# Patient Record
Sex: FEMALE | URBAN - METROPOLITAN AREA
[De-identification: names, ages, dates, MRNs, and addresses within clinical notes are randomized per-mention and may not be internally consistent; named-entity substitution may affect disease eponyms.]

---

## 2017-10-17 NOTE — PATIENT DISCUSSION
CLASSIC MIGRAINE WITHOUT VISUAL AURA:  AVOID PRECIPITATING TRIGGERS. CONTINUE TO FOLLOW WITH NEUROLOGIST.

## 2017-11-28 NOTE — PATIENT DISCUSSION
Stopped Today: Lotemax (loteprednol etabonate): gel: 0.5% 1 drop four times a day into both eyes 10-

## 2017-11-28 NOTE — PATIENT DISCUSSION
DRY EYE WITH INFLAMMATION: CONTINUE PRESERVATIVE FREE ARTIFICIAL TEARS Q2 HOURS, LID HYGIENE. ADD LL PLUGS OU. RETURN SOONER IF SYMPTOMS WORSEN.

## 2017-12-12 NOTE — PATIENT DISCUSSION
MODERATE DRY EYES OU: S/P LL PLUGS. CONTINUE PRESERVATIVE FREE ARTIFICIAL TEARS Q2 HOURS, LID HYGIENE. OMEGA-3. BACK OFF ON PRESERVATIVE SOOTHE. ADD XIIDRA BID OU. GEL OR CRISTINE QHS. RETURN SOONER IF SYMPTOMS WORSEN.

## 2017-12-12 NOTE — PATIENT DISCUSSION
New Prescription: Delmy Carlton (lifitegrast): dropperette: 5% 1 drop twice a day as directed into both eyes 12-

## 2018-06-08 NOTE — PATIENT DISCUSSION
New Prescription: Pranav Fuentes (lifitegrast): dropperette: 5% 1 drop twice a day as directed into both eyes 06-

## 2018-07-02 NOTE — PATIENT DISCUSSION
Continue: Josemanuel Zavala (lifitegrast): dropperette: 5% 1 drop twice a day as directed into both eyes 06-

## 2018-07-02 NOTE — PATIENT DISCUSSION
New Prescription: Lotemax (loteprednol etabonate): ointment: 0.5% a small amount at bedtime into both eyes 07-

## 2018-07-02 NOTE — PATIENT DISCUSSION
MODERATE DRY EYES OU: S/P LL PLUGS. CONTINUE PRESERVATIVE FREE ARTIFICIAL TEARS Q2 HOURS, LID HYGIENE. OMEGA-3. BACK OFF ON PRESERVATIVE SOOTHE. CONTINUE XIIDRA BID OU. GEL OR CRISTINE QHS. RETURN SOONER IF SYMPTOMS WORSEN. ADD LOTEMAX CRISTINE QHS FOR TWO WEEKS OU.

## 2018-07-17 NOTE — PATIENT DISCUSSION
MODERATE DRY EYES OU: S/P LL PLUGS. CONTINUE PRESERVATIVE FREE ARTIFICIAL TEARS Q2 HOURS, LID HYGIENE. OMEGA-3. BACK OFF ON PRESERVATIVE SOOTHE. CONTINUE XIIDRA BID OU. GEL OR CRISTINE QHS. RETURN SOONER IF SYMPTOMS WORSEN. PATIENT NOTICED LIMITED IMPROVEMENT WITH LOTEMAX CRISTINE QHS.  ADD CELLUVISC QID  REFER TO [facility] FOR EVAL OF OSCAR AND PINGUEC

## 2018-07-17 NOTE — PATIENT DISCUSSION
Continue: Alexy Bartlett (lifitegrast): dropperette: 5% 1 drop twice a day as directed into both eyes 06-

## 2018-07-17 NOTE — PATIENT DISCUSSION
Pinguecula Counseling:  I have explained to the patient at length the diagnosis of pinguecula and its pathophysiology. I recommended the patient adequately protect their eyes from excessive UV light and dry, dominique conditions. The use of artificial tears in dry conditions was encouraged. Return for follow-up as scheduled.

## 2018-07-17 NOTE — PATIENT DISCUSSION
Stopped Today: Lotemax (loteprednol etabonate): ointment: 0.5% a small amount at bedtime into both eyes 07-

## 2018-12-31 NOTE — PATIENT DISCUSSION
Continue: Chidi Tobias (lifitegrast): dropperette: 5% 1 drop twice a day as directed into both eyes 06-

## 2018-12-31 NOTE — PATIENT DISCUSSION
MODERATE DRY EYES OU: S/P LL PLUGS. CONTINUE PRESERVATIVE FREE ARTIFICIAL TEARS Q2 HOURS, LID HYGIENE. OMEGA-3. BACK OFF ON PRESERVATIVE SOOTHE. CONTINUE XIIDRA BID OU. GEL OR CRISTINE QHS. RETURN SOONER IF SYMPTOMS WORSEN.  . ADD CELLUVISC QID

## 2019-06-21 NOTE — PATIENT DISCUSSION
Continue: Chico Chisholm (lifitegrast): dropperette: 5% 1 drop twice a day as directed into both eyes 06-

## 2019-06-21 NOTE — PATIENT DISCUSSION
MODERATE DRY EYES OU: S/P LL PLUGS. CONTINUE PRESERVATIVE FREE ARTIFICIAL TEARS Q2 HOURS, LID HYGIENE. OMEGA-3. CONTINUE XIIDRA BID OU. GEL OR CRISTINE QHS. RETURN SOONER IF SYMPTOMS WORSEN.  CONTINUE CELLUVISC QID REFER TO [facility] FOR EVAL OF OSCAR AND PINGUEC

## 2019-11-11 NOTE — PATIENT DISCUSSION
MODERATE DRY EYES OU: S/P LL PLUGS. CONTINUE PRESERVATIVE FREE ARTIFICIAL TEARS Q2 HOURS, LID HYGIENE. OMEGA-3. CONTINUE XIIDRA BID OU. GEL OR CRISTINE QHS. RETURN SOONER IF SYMPTOMS WORSEN. CONTINUE CELLUVISC QI. ADD LOTEMAX CRISTINE QHS X 2 WEEKS.  RESEND REFERRAL TO [facility] FOR EVAL OF OSCAR AND PINGUEC

## 2019-11-12 ENCOUNTER — IMPORTED ENCOUNTER (OUTPATIENT)
Dept: URBAN - METROPOLITAN AREA CLINIC 31 | Facility: CLINIC | Age: 64
End: 2019-11-12

## 2019-11-12 PROBLEM — H25.13: Noted: 2019-11-12

## 2019-11-12 PROCEDURE — 92015 DETERMINE REFRACTIVE STATE: CPT

## 2019-11-12 PROCEDURE — 92014 COMPRE OPH EXAM EST PT 1/>: CPT

## 2019-12-18 NOTE — PATIENT DISCUSSION
Continue: Lotemax (loteprednol etabonate): ointment: 0.5% a small amount at bedtime into both eyes 11-

## 2019-12-18 NOTE — PATIENT DISCUSSION
Continue: Jez Castle (lifitegrast): dropperette: 5% 1 drop twice a day as directed into both eyes 06-

## 2021-08-04 NOTE — PATIENT DISCUSSION
Continue: Refresh Celluvisc (carboxymethylcellulose sodium): dropperette,gel: 1% 1 drop as needed into both eyes Normal genitalia; no lesions; no discharge

## 2021-11-16 ENCOUNTER — IMPORTED ENCOUNTER (OUTPATIENT)
Dept: URBAN - METROPOLITAN AREA CLINIC 31 | Facility: CLINIC | Age: 66
End: 2021-11-16

## 2021-11-16 PROBLEM — H25.13: Noted: 2021-11-16

## 2021-11-16 PROCEDURE — 92015 DETERMINE REFRACTIVE STATE: CPT

## 2021-11-16 PROCEDURE — 92014 COMPRE OPH EXAM EST PT 1/>: CPT

## 2021-11-19 ENCOUNTER — APPOINTMENT (RX ONLY)
Dept: URBAN - METROPOLITAN AREA CLINIC 121 | Facility: CLINIC | Age: 66
Setting detail: DERMATOLOGY
End: 2021-11-19

## 2021-11-19 DIAGNOSIS — L81.4 OTHER MELANIN HYPERPIGMENTATION: ICD-10-CM

## 2021-11-19 DIAGNOSIS — L82.1 OTHER SEBORRHEIC KERATOSIS: ICD-10-CM

## 2021-11-19 DIAGNOSIS — D18.0 HEMANGIOMA: ICD-10-CM

## 2021-11-19 DIAGNOSIS — L85.3 XEROSIS CUTIS: ICD-10-CM

## 2021-11-19 DIAGNOSIS — Z71.89 OTHER SPECIFIED COUNSELING: ICD-10-CM

## 2021-11-19 PROBLEM — D18.01 HEMANGIOMA OF SKIN AND SUBCUTANEOUS TISSUE: Status: ACTIVE | Noted: 2021-11-19

## 2021-11-19 PROCEDURE — 99203 OFFICE O/P NEW LOW 30 MIN: CPT

## 2021-11-19 PROCEDURE — ? SUNSCREEN RECOMMENDATIONS

## 2021-11-19 PROCEDURE — ? PRESCRIPTION

## 2021-11-19 RX ORDER — UREA 400 MG/G
CREAM TOPICAL QD
Qty: 28.35 | Refills: 2 | Status: ERX | COMMUNITY
Start: 2021-11-19

## 2021-11-19 RX ADMIN — UREA: 400 CREAM TOPICAL at 00:00

## 2021-11-19 ASSESSMENT — LOCATION SIMPLE DESCRIPTION DERM
LOCATION SIMPLE: LEFT INDEX FINGER
LOCATION SIMPLE: LEFT RING FINGER
LOCATION SIMPLE: LEFT SMALL FINGER
LOCATION SIMPLE: RIGHT UPPER BACK
LOCATION SIMPLE: LEFT MIDDLE FINGER
LOCATION SIMPLE: RIGHT INDEX FINGER
LOCATION SIMPLE: RIGHT SMALL FINGER
LOCATION SIMPLE: RIGHT MIDDLE FINGER
LOCATION SIMPLE: RIGHT RING FINGER
LOCATION SIMPLE: ABDOMEN

## 2021-11-19 ASSESSMENT — LOCATION DETAILED DESCRIPTION DERM
LOCATION DETAILED: LEFT DISTAL PALMAR INDEX FINGER
LOCATION DETAILED: LEFT DISTAL PALMAR RING FINGER
LOCATION DETAILED: RIGHT DISTAL PALMAR MIDDLE FINGER
LOCATION DETAILED: RIGHT DISTAL PALMAR INDEX FINGER
LOCATION DETAILED: PERIUMBILICAL SKIN
LOCATION DETAILED: LEFT DISTAL PALMAR SMALL FINGER
LOCATION DETAILED: RIGHT DISTAL PALMAR RING FINGER
LOCATION DETAILED: RIGHT SUPERIOR MEDIAL UPPER BACK
LOCATION DETAILED: LEFT MIDDLE FINGERTIP
LOCATION DETAILED: LEFT RIB CAGE
LOCATION DETAILED: RIGHT MEDIAL UPPER BACK
LOCATION DETAILED: RIGHT DISTAL PALMAR SMALL FINGER

## 2021-11-19 ASSESSMENT — LOCATION ZONE DERM
LOCATION ZONE: TRUNK
LOCATION ZONE: FINGER

## 2022-04-02 ASSESSMENT — VISUAL ACUITY
OS_CC: 20/30-1
OD_SC: 20/25-3
OS_SC: 20/20-1
OD_PH: SC 20/20 -1
OD_CC: 20/40+1
OD_SC: 20/5017''
OS_CC: 20/30-1
OD_CC: 20/40

## 2022-04-02 ASSESSMENT — TONOMETRY
OS_IOP_MMHG: 16
OD_IOP_MMHG: 13
OD_IOP_MMHG: 16
OS_IOP_MMHG: 14

## 2022-07-09 ENCOUNTER — TELEPHONE ENCOUNTER (OUTPATIENT)
Dept: URBAN - METROPOLITAN AREA CLINIC 121 | Facility: CLINIC | Age: 67
End: 2022-07-09

## 2022-07-10 ENCOUNTER — TELEPHONE ENCOUNTER (OUTPATIENT)
Dept: URBAN - METROPOLITAN AREA CLINIC 121 | Facility: CLINIC | Age: 67
End: 2022-07-10

## 2022-07-30 ENCOUNTER — TELEPHONE ENCOUNTER (OUTPATIENT)
Age: 67
End: 2022-07-30

## 2022-07-31 ENCOUNTER — TELEPHONE ENCOUNTER (OUTPATIENT)
Age: 67
End: 2022-07-31

## 2022-08-05 ENCOUNTER — APPOINTMENT (RX ONLY)
Dept: URBAN - METROPOLITAN AREA CLINIC 121 | Facility: CLINIC | Age: 67
Setting detail: DERMATOLOGY
End: 2022-08-05

## 2022-08-05 DIAGNOSIS — L30.9 DERMATITIS, UNSPECIFIED: ICD-10-CM

## 2022-08-05 DIAGNOSIS — L29.89 OTHER PRURITUS: ICD-10-CM

## 2022-08-05 PROBLEM — L29.8 OTHER PRURITUS: Status: ACTIVE | Noted: 2022-08-05

## 2022-08-05 PROCEDURE — 99214 OFFICE O/P EST MOD 30 MIN: CPT | Mod: 25

## 2022-08-05 PROCEDURE — ? PHOTO-DOCUMENTATION

## 2022-08-05 PROCEDURE — ? COUNSELING

## 2022-08-05 PROCEDURE — 11102 TANGNTL BX SKIN SINGLE LES: CPT

## 2022-08-05 PROCEDURE — ? PRESCRIPTION MEDICATION MANAGEMENT

## 2022-08-05 PROCEDURE — ? BIOPSY BY SHAVE METHOD

## 2022-08-05 ASSESSMENT — LOCATION DETAILED DESCRIPTION DERM
LOCATION DETAILED: RIGHT MID ULNAR DORSAL INDEX FINGER
LOCATION DETAILED: RIGHT INDEX PROXIMAL INTERPHALANGEAL JOINT
LOCATION DETAILED: LEFT MID DORSAL INDEX FINGER

## 2022-08-05 ASSESSMENT — LOCATION SIMPLE DESCRIPTION DERM
LOCATION SIMPLE: RIGHT INDEX FINGER
LOCATION SIMPLE: LEFT INDEX FINGER

## 2022-08-05 ASSESSMENT — LOCATION ZONE DERM: LOCATION ZONE: FINGER

## 2022-08-05 NOTE — PROCEDURE: BIOPSY BY SHAVE METHOD
Detail Level: Detailed
Depth Of Biopsy: dermis
Was A Bandage Applied: Yes
Size Of Lesion In Cm: 0
Biopsy Type: H and E
Biopsy Method: Personna blade
Anesthesia Type: 1% lidocaine without epinephrine
Anesthesia Volume In Cc (Will Not Render If 0): 0.5
Additional Anesthesia Type: 0.5% lidocaine and 0.5% bupivacaine in a 1:1 solution with 1:200,000 epinephrine and a 1:10 solution of 8.4% sodium bicarbonate
Hemostasis: Aluminum Chloride
Wound Care: Petrolatum
Dressing: bandage
Destruction After The Procedure: No
Type Of Destruction Used: Curettage
Curettage Text: The wound bed was treated with curettage after the biopsy was performed.
Cryotherapy Text: The wound bed was treated with cryotherapy after the biopsy was performed.
Electrodesiccation Text: The wound bed was treated with electrodesiccation after the biopsy was performed.
Electrodesiccation And Curettage Text: The wound bed was treated with electrodesiccation and curettage after the biopsy was performed.
Silver Nitrate Text: The wound bed was treated with silver nitrate after the biopsy was performed.
Lab: [facility] [place]
Lab Facility: [id number]
Consent: The provider's intent is to obtain a tissue sample solely for diagnostic purposes. Written consent to obtain tissue sample was obtained and risks were reviewed including but not limited to scarring, infection, bleeding, scabbing, incomplete removal, nerve damage and allergy to anesthesia.
Post-Care Instructions: I reviewed with the patient in detail post-care instructions. Patient is to keep the biopsy site dry overnight, and then apply bacitracin twice daily until healed. Patient may apply hydrogen peroxide soaks to remove any crusting.
Notification Instructions: Patient will be notified of biopsy results. However, patient instructed to call the office if not contacted within 2 weeks.
Billing Type: [employer]
Information: Selecting Yes will display possible errors in your note based on the variables you have selected. This validation is only offered as a suggestion for you. PLEASE NOTE THAT THE VALIDATION TEXT WILL BE REMOVED WHEN YOU FINALIZE YOUR NOTE. IF YOU WANT TO FAX A PRELIMINARY NOTE YOU WILL NEED TO TOGGLE THIS TO 'NO' IF YOU DO NOT WANT IT IN YOUR FAXED NOTE.

## 2022-08-05 NOTE — PROCEDURE: PRESCRIPTION MEDICATION MANAGEMENT
Detail Level: Simple
Render In Strict Bullet Format?: No
Plan: Biopsy aftercare handout provided provided to patient. \\nSensitive skin care regimen advised and handout given to patient. \\nPatient declines prescription today.
Discontinue Regimen: fragrant soaps
Initiate Treatment: wearing gloves when cleaning

## 2022-08-16 ENCOUNTER — RX ONLY (OUTPATIENT)
Age: 67
Setting detail: RX ONLY
End: 2022-08-16

## 2022-08-16 RX ORDER — CALCIPOTRIENE 0.05 MG/G
1 CREAM TOPICAL BID
Qty: 60 | Refills: 0 | Status: ERX | COMMUNITY
Start: 2022-08-16

## 2022-09-20 ENCOUNTER — PREPPED CHART (OUTPATIENT)
Dept: URBAN - METROPOLITAN AREA CLINIC 31 | Facility: CLINIC | Age: 67
End: 2022-09-20

## 2022-11-22 ENCOUNTER — COMPREHENSIVE EXAM (OUTPATIENT)
Dept: URBAN - METROPOLITAN AREA CLINIC 31 | Facility: CLINIC | Age: 67
End: 2022-11-22

## 2022-11-22 DIAGNOSIS — H52.4: ICD-10-CM

## 2022-11-22 PROCEDURE — 92014 COMPRE OPH EXAM EST PT 1/>: CPT

## 2022-11-22 PROCEDURE — 92015 DETERMINE REFRACTIVE STATE: CPT

## 2022-11-22 ASSESSMENT — VISUAL ACUITY
OD_CC: 20/25-2
OS_CC: J1+
OD_CC: J1
OS_CC: 20/25

## 2022-11-22 ASSESSMENT — TONOMETRY
OS_IOP_MMHG: 14
OD_IOP_MMHG: 14

## 2022-12-02 ENCOUNTER — APPOINTMENT (RX ONLY)
Dept: URBAN - METROPOLITAN AREA CLINIC 333 | Facility: CLINIC | Age: 67
Setting detail: DERMATOLOGY
End: 2022-12-02

## 2022-12-02 ENCOUNTER — APPOINTMENT (RX ONLY)
Dept: URBAN - METROPOLITAN AREA CLINIC 148 | Facility: CLINIC | Age: 67
Setting detail: DERMATOLOGY
End: 2022-12-02

## 2022-12-02 DIAGNOSIS — L40.0 PSORIASIS VULGARIS: ICD-10-CM

## 2022-12-02 PROCEDURE — ? PRESCRIPTION

## 2022-12-02 PROCEDURE — ? COUNSELING

## 2022-12-02 PROCEDURE — 99204 OFFICE O/P NEW MOD 45 MIN: CPT

## 2022-12-02 RX ORDER — TRIAMCINOLONE ACETONIDE 1 MG/G
CREAM TOPICAL
Qty: 454 | Refills: 1 | Status: ERX | COMMUNITY
Start: 2022-12-02

## 2022-12-02 RX ORDER — FLUOCINONIDE 0.5 MG/ML
SOLUTION TOPICAL
Qty: 60 | Refills: 2 | Status: ERX | COMMUNITY
Start: 2022-12-02

## 2022-12-02 RX ADMIN — TRIAMCINOLONE ACETONIDE: 1 CREAM TOPICAL at 00:00

## 2022-12-02 RX ADMIN — FLUOCINONIDE: 0.5 SOLUTION TOPICAL at 00:00

## 2022-12-02 ASSESSMENT — LOCATION SIMPLE DESCRIPTION DERM
LOCATION SIMPLE: LEFT RING FINGERNAIL
LOCATION SIMPLE: LEFT MIDDLE FINGERNAIL
LOCATION SIMPLE: RIGHT RING FINGERNAIL
LOCATION SIMPLE: RIGHT HAND
LOCATION SIMPLE: RIGHT MIDDLE FINGERNAIL
LOCATION SIMPLE: LEFT THUMBNAIL
LOCATION SIMPLE: LEFT THIGH
LOCATION SIMPLE: CHEST
LOCATION SIMPLE: RIGHT THUMBNAIL
LOCATION SIMPLE: LEFT HAND
LOCATION SIMPLE: LEFT SMALL FINGERNAIL
LOCATION SIMPLE: RIGHT SMALL FINGERNAIL
LOCATION SIMPLE: RIGHT INDEX FINGERNAIL
LOCATION SIMPLE: LEFT INDEX FINGERNAIL

## 2022-12-02 ASSESSMENT — LOCATION DETAILED DESCRIPTION DERM
LOCATION DETAILED: LEFT SMALL FINGERNAIL
LOCATION DETAILED: LEFT INDEX FINGERNAIL
LOCATION DETAILED: RIGHT RING FINGERNAIL
LOCATION DETAILED: LEFT MIDDLE FINGER LUNULA
LOCATION DETAILED: RIGHT MIDDLE FINGERNAIL
LOCATION DETAILED: RIGHT ULNAR DORSAL HAND
LOCATION DETAILED: LEFT RING FINGERNAIL
LOCATION DETAILED: MIDDLE STERNUM
LOCATION DETAILED: LEFT THUMBNAIL
LOCATION DETAILED: LEFT ANTERIOR PROXIMAL THIGH
LOCATION DETAILED: RIGHT INDEX FINGERNAIL
LOCATION DETAILED: LEFT ULNAR DORSAL HAND
LOCATION DETAILED: RIGHT SMALL FINGERNAIL
LOCATION DETAILED: RIGHT THUMBNAIL

## 2022-12-02 ASSESSMENT — LOCATION ZONE DERM
LOCATION ZONE: TRUNK
LOCATION ZONE: HAND
LOCATION ZONE: LEG
LOCATION ZONE: FINGERNAIL

## 2022-12-02 NOTE — HPI: EVALUATION OF SKIN LESION(S)
What Type Of Note Output Would You Prefer (Optional)?: Bullet Format
Hpi Title: Evaluation of Skin Lesions
normal

## 2022-12-30 ENCOUNTER — APPOINTMENT (RX ONLY)
Dept: URBAN - METROPOLITAN AREA CLINIC 148 | Facility: CLINIC | Age: 67
Setting detail: DERMATOLOGY
End: 2022-12-30

## 2022-12-30 DIAGNOSIS — B00.1 HERPESVIRAL VESICULAR DERMATITIS: ICD-10-CM

## 2022-12-30 DIAGNOSIS — L40.0 PSORIASIS VULGARIS: ICD-10-CM

## 2022-12-30 PROCEDURE — ? PRESCRIPTION

## 2022-12-30 PROCEDURE — ? ADDITIONAL NOTES

## 2022-12-30 PROCEDURE — 99214 OFFICE O/P EST MOD 30 MIN: CPT

## 2022-12-30 PROCEDURE — ? PRESCRIPTION MEDICATION MANAGEMENT

## 2022-12-30 PROCEDURE — ? COUNSELING

## 2022-12-30 RX ORDER — CLOBETASOL PROPIONATE 0.5 MG/G
CREAM TOPICAL
Qty: 60 | Refills: 2 | Status: ERX | COMMUNITY
Start: 2022-12-30

## 2022-12-30 RX ORDER — FLUOCINONIDE 0.5 MG/ML
SOLUTION TOPICAL
Qty: 60 | Refills: 2 | Status: ERX

## 2022-12-30 RX ORDER — ACYCLOVIR 50 MG/G
OINTMENT TOPICAL
Qty: 30 | Refills: 0 | Status: ERX | COMMUNITY
Start: 2022-12-30

## 2022-12-30 RX ADMIN — ACYCLOVIR: 50 OINTMENT TOPICAL at 00:00

## 2022-12-30 RX ADMIN — CLOBETASOL PROPIONATE: 0.5 CREAM TOPICAL at 00:00

## 2022-12-30 ASSESSMENT — LOCATION SIMPLE DESCRIPTION DERM
LOCATION SIMPLE: LEFT THUMBNAIL
LOCATION SIMPLE: RIGHT MIDDLE FINGERNAIL
LOCATION SIMPLE: RIGHT INDEX FINGERNAIL
LOCATION SIMPLE: RIGHT HAND
LOCATION SIMPLE: RIGHT SMALL FINGERNAIL
LOCATION SIMPLE: LEFT HAND
LOCATION SIMPLE: LEFT INDEX FINGERNAIL
LOCATION SIMPLE: RIGHT RING FINGERNAIL
LOCATION SIMPLE: LEFT RING FINGERNAIL
LOCATION SIMPLE: LEFT SMALL FINGERNAIL
LOCATION SIMPLE: LEFT MIDDLE FINGERNAIL
LOCATION SIMPLE: RIGHT THUMBNAIL

## 2022-12-30 ASSESSMENT — LOCATION DETAILED DESCRIPTION DERM
LOCATION DETAILED: LEFT THUMBNAIL
LOCATION DETAILED: RIGHT THUMBNAIL
LOCATION DETAILED: RIGHT MIDDLE FINGERNAIL
LOCATION DETAILED: RIGHT RING FINGERNAIL
LOCATION DETAILED: LEFT ULNAR DORSAL HAND
LOCATION DETAILED: LEFT SMALL FINGERNAIL
LOCATION DETAILED: LEFT INDEX FINGERNAIL
LOCATION DETAILED: RIGHT ULNAR DORSAL HAND
LOCATION DETAILED: LEFT RING FINGERNAIL
LOCATION DETAILED: RIGHT INDEX FINGERNAIL
LOCATION DETAILED: RIGHT SMALL FINGERNAIL
LOCATION DETAILED: LEFT MIDDLE FINGER LUNULA

## 2022-12-30 ASSESSMENT — LOCATION ZONE DERM
LOCATION ZONE: HAND
LOCATION ZONE: FINGERNAIL

## 2022-12-30 NOTE — PROCEDURE: ADDITIONAL NOTES
Additional Notes: Discussed Otezla with patient, recommended patient get X-ray of hands to rule out psoriatic arthritis
Render Risk Assessment In Note?: no
Detail Level: Zone

## 2022-12-30 NOTE — PROCEDURE: PRESCRIPTION MEDICATION MANAGEMENT
Render In Strict Bullet Format?: No
Discontinue Regimen: Triamcinolone
Initiate Treatment: fluocinonide 0.05 % topical solution Bid x 2 weeks then BID Monday, Wednesday, and Friday for maintenance, Clobetasol cream BID X 2 weeks then 2-3 times weekly for maintenance
Detail Level: Zone
Samples Given: Zoryve

## 2023-09-19 NOTE — PATIENT DISCUSSION
Nuclear Sclerotic Cataract OU: Explained how cataracts can effect vision. Recommend clinical observation. The patient was advised to contact us if any change or worsening of vision. Sski Pregnancy And Lactation Text: This medication is Pregnancy Category D and isn't considered safe during pregnancy. It is excreted in breast milk.

## 2023-11-28 ENCOUNTER — COMPREHENSIVE EXAM (OUTPATIENT)
Dept: URBAN - METROPOLITAN AREA CLINIC 31 | Facility: CLINIC | Age: 68
End: 2023-11-28

## 2023-11-28 DIAGNOSIS — H52.4: ICD-10-CM

## 2023-11-28 PROCEDURE — 92015 DETERMINE REFRACTIVE STATE: CPT

## 2023-11-28 PROCEDURE — 92014 COMPRE OPH EXAM EST PT 1/>: CPT

## 2023-11-28 ASSESSMENT — TONOMETRY
OD_IOP_MMHG: 16
OS_IOP_MMHG: 15

## 2023-11-28 ASSESSMENT — VISUAL ACUITY
OD_CC: 20/25
OS_CC: 20/25
OD_CC: J1
OS_CC: J2

## 2024-10-16 ENCOUNTER — APPOINTMENT (RX ONLY)
Dept: URBAN - METROPOLITAN AREA CLINIC 121 | Facility: CLINIC | Age: 69
Setting detail: DERMATOLOGY
End: 2024-10-16

## 2024-10-16 DIAGNOSIS — Z71.89 OTHER SPECIFIED COUNSELING: ICD-10-CM

## 2024-10-16 DIAGNOSIS — L57.0 ACTINIC KERATOSIS: ICD-10-CM

## 2024-10-16 DIAGNOSIS — L81.4 OTHER MELANIN HYPERPIGMENTATION: ICD-10-CM

## 2024-10-16 DIAGNOSIS — D18.0 HEMANGIOMA: ICD-10-CM

## 2024-10-16 DIAGNOSIS — L40.0 PSORIASIS VULGARIS: ICD-10-CM

## 2024-10-16 DIAGNOSIS — L82.1 OTHER SEBORRHEIC KERATOSIS: ICD-10-CM

## 2024-10-16 PROBLEM — D18.01 HEMANGIOMA OF SKIN AND SUBCUTANEOUS TISSUE: Status: ACTIVE | Noted: 2024-10-16

## 2024-10-16 PROCEDURE — 17003 DESTRUCT PREMALG LES 2-14: CPT

## 2024-10-16 PROCEDURE — ? PRESCRIPTION MEDICATION MANAGEMENT

## 2024-10-16 PROCEDURE — 99214 OFFICE O/P EST MOD 30 MIN: CPT | Mod: 25

## 2024-10-16 PROCEDURE — 17000 DESTRUCT PREMALG LESION: CPT

## 2024-10-16 PROCEDURE — ? LIQUID NITROGEN

## 2024-10-16 PROCEDURE — ? COUNSELING

## 2024-10-16 ASSESSMENT — LOCATION DETAILED DESCRIPTION DERM
LOCATION DETAILED: RIGHT MEDIAL FRONTAL SCALP
LOCATION DETAILED: RIGHT THENAR EMINENCE
LOCATION DETAILED: NASAL DORSUM
LOCATION DETAILED: INFERIOR THORACIC SPINE
LOCATION DETAILED: LEFT CENTRAL EYEBROW
LOCATION DETAILED: LEFT DISTAL DORSAL FOREARM
LOCATION DETAILED: LEFT THENAR EMINENCE
LOCATION DETAILED: RIGHT INFRAMAMMARY CREASE (INNER QUADRANT)
LOCATION DETAILED: LEFT RADIAL PALM
LOCATION DETAILED: NASAL ROOT
LOCATION DETAILED: RIGHT CLAVICULAR SKIN
LOCATION DETAILED: LEFT LATERAL FOREHEAD
LOCATION DETAILED: LOWER STERNUM
LOCATION DETAILED: SUPERIOR THORACIC SPINE
LOCATION DETAILED: MIDDLE STERNUM
LOCATION DETAILED: LEFT SUPERIOR FOREHEAD
LOCATION DETAILED: RIGHT ULNAR PALM
LOCATION DETAILED: UPPER STERNUM
LOCATION DETAILED: SUBXIPHOID

## 2024-10-16 ASSESSMENT — LOCATION SIMPLE DESCRIPTION DERM
LOCATION SIMPLE: RIGHT HAND
LOCATION SIMPLE: LEFT HAND
LOCATION SIMPLE: NOSE
LOCATION SIMPLE: UPPER BACK
LOCATION SIMPLE: RIGHT BREAST
LOCATION SIMPLE: RIGHT SCALP
LOCATION SIMPLE: CHEST
LOCATION SIMPLE: LEFT EYEBROW
LOCATION SIMPLE: LEFT FOREHEAD
LOCATION SIMPLE: RIGHT CLAVICULAR SKIN
LOCATION SIMPLE: LEFT FOREARM
LOCATION SIMPLE: ABDOMEN

## 2024-10-16 ASSESSMENT — LOCATION ZONE DERM
LOCATION ZONE: HAND
LOCATION ZONE: NOSE
LOCATION ZONE: TRUNK
LOCATION ZONE: FACE
LOCATION ZONE: ARM
LOCATION ZONE: SCALP

## 2024-10-16 ASSESSMENT — ITCH NUMERIC RATING SCALE: HOW SEVERE IS YOUR ITCHING?: 0

## 2024-10-16 ASSESSMENT — BSA PSORIASIS: % BODY COVERED IN PSORIASIS: 0

## 2024-10-16 NOTE — PROCEDURE: LIQUID NITROGEN
Show Applicator Variable?: Yes
Number Of Freeze-Thaw Cycles: 2 freeze-thaw cycles
Duration Of Freeze Thaw-Cycle (Seconds): 0
Render Note In Bullet Format When Appropriate: No
Post-Care Instructions: I reviewed with the patient in detail post-care instructions. Patient is to wear sunprotection, and avoid picking at any of the treated lesions. Pt may apply Vaseline to crusted or scabbing areas.
Detail Level: Detailed
Consent: The patient's consent was obtained including but not limited to risks of crusting, scabbing, blistering, scarring, darker or lighter pigmentary change, recurrence, incomplete removal and infection.

## 2024-10-16 NOTE — PROCEDURE: PRESCRIPTION MEDICATION MANAGEMENT
Continue Regimen: Cloebtasol as directed
Detail Level: Zone
Plan: Patient declined needing any refills
Render In Strict Bullet Format?: No